# Patient Record
Sex: FEMALE | Race: ASIAN | NOT HISPANIC OR LATINO | URBAN - NONMETROPOLITAN AREA
[De-identification: names, ages, dates, MRNs, and addresses within clinical notes are randomized per-mention and may not be internally consistent; named-entity substitution may affect disease eponyms.]

---

## 2022-02-28 ENCOUNTER — CLAIMS CREATED FROM THE CLAIM WINDOW (OUTPATIENT)
Dept: URBAN - NONMETROPOLITAN AREA CLINIC 4 | Facility: CLINIC | Age: 57
End: 2022-02-28
Payer: MEDICARE

## 2022-02-28 ENCOUNTER — WEB ENCOUNTER (OUTPATIENT)
Dept: URBAN - NONMETROPOLITAN AREA CLINIC 4 | Facility: CLINIC | Age: 57
End: 2022-02-28

## 2022-02-28 ENCOUNTER — LAB OUTSIDE AN ENCOUNTER (OUTPATIENT)
Dept: URBAN - NONMETROPOLITAN AREA CLINIC 4 | Facility: CLINIC | Age: 57
End: 2022-02-28

## 2022-02-28 DIAGNOSIS — K21.9 GERD (GASTROESOPHAGEAL REFLUX DISEASE): ICD-10-CM

## 2022-02-28 DIAGNOSIS — R13.10 DYSPHAGIA: ICD-10-CM

## 2022-02-28 DIAGNOSIS — K22.9 ESOPHAGEAL LESION: ICD-10-CM

## 2022-02-28 PROCEDURE — G9903 PT SCRN TBCO ID AS NON USER: HCPCS | Performed by: INTERNAL MEDICINE

## 2022-02-28 PROCEDURE — G8417 CALC BMI ABV UP PARAM F/U: HCPCS | Performed by: INTERNAL MEDICINE

## 2022-02-28 PROCEDURE — G8427 DOCREV CUR MEDS BY ELIG CLIN: HCPCS | Performed by: INTERNAL MEDICINE

## 2022-02-28 PROCEDURE — 99204 OFFICE O/P NEW MOD 45 MIN: CPT | Performed by: INTERNAL MEDICINE

## 2022-02-28 PROCEDURE — 99244 OFF/OP CNSLTJ NEW/EST MOD 40: CPT | Performed by: INTERNAL MEDICINE

## 2022-02-28 PROCEDURE — 1036F TOBACCO NON-USER: CPT | Performed by: INTERNAL MEDICINE

## 2022-02-28 RX ORDER — PANTOPRAZOLE SODIUM 40 MG/1
1 TABLET TABLET, DELAYED RELEASE ORAL ONCE A DAY
Qty: 90 | Refills: 1 | OUTPATIENT
Start: 2022-02-28

## 2022-02-28 RX ORDER — AMLODIPINE BESYLATE 5 MG/1
1 TABLET TABLET ORAL ONCE A DAY
Status: ACTIVE | COMMUNITY

## 2022-02-28 NOTE — HPI-TODAY'S VISIT:
02/28/2022 Patienti s a 56 year old  female who presents on referral from Dr. Modesto Gabriel MD for EUS consultation. A copy of the note will be sent to the referring provider. EGD on 8/2/2021 by Dr. Hien Ray in [place] showed submucosal lesion in the distal esophagus. Biopsies were benign. Colonoscopy on 8/2/2021 showed diverticulosis and hemorrhoids, no polyps. Patient admits occasional dysphagia for solids with coughing and nausea while eating. She also reports sour taste in her mouth. Her only chronic medication is Amlodipine. Recent thryoid biopsy was normal, as per her.

## 2022-03-22 ENCOUNTER — CLAIMS CREATED FROM THE CLAIM WINDOW (OUTPATIENT)
Dept: URBAN - METROPOLITAN AREA MEDICAL CENTER 24 | Facility: MEDICAL CENTER | Age: 57
End: 2022-03-22
Payer: MEDICARE

## 2022-03-22 ENCOUNTER — CLAIMS CREATED FROM THE CLAIM WINDOW (OUTPATIENT)
Dept: URBAN - METROPOLITAN AREA MEDICAL CENTER 24 | Facility: MEDICAL CENTER | Age: 57
End: 2022-03-22

## 2022-03-22 DIAGNOSIS — K22.89 ESOPHAGEAL BLEED, NON-VARICEAL: ICD-10-CM

## 2022-03-22 PROCEDURE — 43238 EGD US FINE NEEDLE BX/ASPIR: CPT | Performed by: INTERNAL MEDICINE

## 2022-03-24 ENCOUNTER — TELEPHONE ENCOUNTER (OUTPATIENT)
Dept: URBAN - NONMETROPOLITAN AREA CLINIC 4 | Facility: CLINIC | Age: 57
End: 2022-03-24

## 2022-03-24 RX ORDER — SUCRALFATE 1 G/1
1 TABLET ON AN EMPTY STOMACH TABLET ORAL TWICE A DAY
Qty: 60 TABLET | Refills: 1 | OUTPATIENT
Start: 2022-03-24 | End: 2022-05-23

## 2022-04-11 ENCOUNTER — OFFICE VISIT (OUTPATIENT)
Dept: URBAN - NONMETROPOLITAN AREA CLINIC 4 | Facility: CLINIC | Age: 57
End: 2022-04-11
Payer: MEDICARE

## 2022-04-11 DIAGNOSIS — K21.9 GERD (GASTROESOPHAGEAL REFLUX DISEASE): ICD-10-CM

## 2022-04-11 DIAGNOSIS — K22.9 ESOPHAGEAL LESION: ICD-10-CM

## 2022-04-11 DIAGNOSIS — R13.10 DYSPHAGIA: ICD-10-CM

## 2022-04-11 PROCEDURE — G9903 PT SCRN TBCO ID AS NON USER: HCPCS | Performed by: INTERNAL MEDICINE

## 2022-04-11 PROCEDURE — 99214 OFFICE O/P EST MOD 30 MIN: CPT | Performed by: INTERNAL MEDICINE

## 2022-04-11 PROCEDURE — G8427 DOCREV CUR MEDS BY ELIG CLIN: HCPCS | Performed by: INTERNAL MEDICINE

## 2022-04-11 PROCEDURE — G8417 CALC BMI ABV UP PARAM F/U: HCPCS | Performed by: INTERNAL MEDICINE

## 2022-04-11 RX ORDER — AMLODIPINE BESYLATE 5 MG/1
1 TABLET TABLET ORAL ONCE A DAY
Status: ACTIVE | COMMUNITY

## 2022-04-11 RX ORDER — SUCRALFATE 1 G/1
1 TABLET ON AN EMPTY STOMACH TABLET ORAL TWICE A DAY
Qty: 60 TABLET | Refills: 1 | Status: ACTIVE | COMMUNITY
Start: 2022-03-24 | End: 2022-05-23

## 2022-04-11 RX ORDER — PANTOPRAZOLE SODIUM 40 MG/1
1 TABLET TABLET, DELAYED RELEASE ORAL ONCE A DAY
Qty: 90 | Refills: 1 | Status: ACTIVE | COMMUNITY
Start: 2022-02-28

## 2022-04-11 NOTE — HPI-TODAY'S VISIT:
02/28/2022 Patienti s a 56 year old  female who presents on referral from Dr. Modesto Gabriel MD for EUS consultation. A copy of the note will be sent to the referring provider. EGD on 8/2/2021 by Dr. Hien Ray in [place] showed submucosal lesion in the distal esophagus. Biopsies were benign. Colonoscopy on 8/2/2021 showed diverticulosis and hemorrhoids, no polyps. Patient admits occasional dysphagia for solids with coughing and nausea while eating. She also reports sour taste in her mouth. Her only chronic medication is Amlodipine. Recent thryoid biopsy was normal, as per her.   4/1/2022  Patient presents for a follow up office visit. EUS on 3/22/2022 showed 2 x 2.5cm submucosal lesion in the mid-esophagus arising from muscularis propria. Biopsies were benign. Patient continues to have heartburn symptoms, not under control on Prilosec. She states she was unable to afford Protonix prescription sent from last visit.

## 2022-10-17 ENCOUNTER — OFFICE VISIT (OUTPATIENT)
Dept: URBAN - NONMETROPOLITAN AREA CLINIC 4 | Facility: CLINIC | Age: 57
End: 2022-10-17

## 2022-10-17 ENCOUNTER — DASHBOARD ENCOUNTERS (OUTPATIENT)
Age: 57
End: 2022-10-17

## 2022-10-17 ENCOUNTER — OFFICE VISIT (OUTPATIENT)
Dept: URBAN - NONMETROPOLITAN AREA CLINIC 4 | Facility: CLINIC | Age: 57
End: 2022-10-17
Payer: MEDICARE

## 2022-10-17 VITALS
DIASTOLIC BLOOD PRESSURE: 64 MMHG | TEMPERATURE: 97.9 F | HEART RATE: 78 BPM | SYSTOLIC BLOOD PRESSURE: 100 MMHG | BODY MASS INDEX: 30.66 KG/M2 | HEIGHT: 65 IN | WEIGHT: 184 LBS

## 2022-10-17 DIAGNOSIS — K22.9 ESOPHAGEAL LESION: ICD-10-CM

## 2022-10-17 DIAGNOSIS — R05.3 CHRONIC COUGH: ICD-10-CM

## 2022-10-17 DIAGNOSIS — K21.9 GERD (GASTROESOPHAGEAL REFLUX DISEASE): ICD-10-CM

## 2022-10-17 DIAGNOSIS — R13.10 DYSPHAGIA: ICD-10-CM

## 2022-10-17 PROBLEM — 37657006 DISORDER OF ESOPHAGUS: Status: ACTIVE | Noted: 2022-02-28

## 2022-10-17 PROBLEM — 40739000 DYSPHAGIA: Status: ACTIVE | Noted: 2022-02-28

## 2022-10-17 PROBLEM — 235595009 GASTROESOPHAGEAL REFLUX DISEASE: Status: ACTIVE | Noted: 2022-02-28

## 2022-10-17 PROCEDURE — G8427 DOCREV CUR MEDS BY ELIG CLIN: HCPCS | Performed by: INTERNAL MEDICINE

## 2022-10-17 PROCEDURE — 99214 OFFICE O/P EST MOD 30 MIN: CPT | Performed by: INTERNAL MEDICINE

## 2022-10-17 PROCEDURE — G9903 PT SCRN TBCO ID AS NON USER: HCPCS | Performed by: INTERNAL MEDICINE

## 2022-10-17 PROCEDURE — G8417 CALC BMI ABV UP PARAM F/U: HCPCS | Performed by: INTERNAL MEDICINE

## 2022-10-17 RX ORDER — PANTOPRAZOLE SODIUM 40 MG/1
1 TABLET TABLET, DELAYED RELEASE ORAL ONCE A DAY
Qty: 90 | Refills: 1 | Status: ACTIVE | COMMUNITY
Start: 2022-02-28

## 2022-10-17 RX ORDER — AMLODIPINE BESYLATE 5 MG/1
1 TABLET TABLET ORAL ONCE A DAY
Status: ACTIVE | COMMUNITY

## 2022-10-17 RX ORDER — PANTOPRAZOLE SODIUM 40 MG/1
1 TABLET TABLET, DELAYED RELEASE ORAL TWICE A DAY
Qty: 180 TABLET | Refills: 1 | OUTPATIENT
Start: 2022-10-17

## 2022-10-17 RX ORDER — SUCRALFATE 1 G/1
1 TABLET ON AN EMPTY STOMACH TABLET ORAL TWICE A DAY
Qty: 60 TABLET | Refills: 1 | OUTPATIENT
Start: 2022-10-17 | End: 2022-12-15

## 2022-10-17 NOTE — PHYSICAL EXAM CONSTITUTIONAL:
Physiatry well developed, well nourished , in no acute distress , ambulating without difficulty , normal communication ability

## 2022-10-17 NOTE — HPI-TODAY'S VISIT:
02/28/2022 Patienti s a 56 year old  female who presents on referral from Dr. Modesto Gabriel MD for EUS consultation. A copy of the note will be sent to the referring provider. EGD on 8/2/2021 by Dr. Hien Ray in [place] showed submucosal lesion in the distal esophagus. Biopsies were benign. Colonoscopy on 8/2/2021 showed diverticulosis and hemorrhoids, no polyps. Patient admits occasional dysphagia for solids with coughing and nausea while eating. She also reports sour taste in her mouth. Her only chronic medication is Amlodipine. Recent thryoid biopsy was normal, as per her.   4/1/2022  Patient presents for a follow up office visit. EUS on 3/22/2022 showed 2 x 2.5cm submucosal lesion in the mid-esophagus arising from muscularis propria. Biopsies were benign. Patient continues to have heartburn symptoms, not under control on Prilosec. She states she was unable to afford Protonix prescription sent from last visit.  10/17/2022 Patient presents for a follow up. Patient denies stomach issues or stomach pain. She states that she only has pain in her throat area and that she has been coughing a lot. She states that when she drinks coffee that she then feels acid reflux. She states that she takes omeprazole for it and that it does not really help her. Patient also states that she takes famotidine but that it does not help her either. Patient states that she has tried inhalers but that it has not helped her. Patient states that she saw an ENT doctor and that they told her that they want to remove them.

## 2022-10-17 NOTE — PHYSICAL EXAM NECK/THYROID:
normal appearance , without tenderness upon palpation , no deformities , trachea midline , Thyroid normal size , no thyroid nodules , no masses , no JVD , thyroid nontender
Detail Level: Simple
Additional Notes: Advice patient to use Cerave SA on arms it will help exfoliate the skin.
Additional Notes: inflammation and drainage resolved after treatment of underlying dental abscess. Discussed fat transfer can certainly help with atrophy, and that fillers are also available with cosmetic dermatologists.